# Patient Record
Sex: FEMALE | Race: WHITE | Employment: PART TIME | URBAN - METROPOLITAN AREA
[De-identification: names, ages, dates, MRNs, and addresses within clinical notes are randomized per-mention and may not be internally consistent; named-entity substitution may affect disease eponyms.]

---

## 2019-01-28 ENCOUNTER — TELEPHONE (OUTPATIENT)
Dept: ORTHOPEDICS | Facility: CLINIC | Age: 59
End: 2019-01-28

## 2019-01-28 ENCOUNTER — OFFICE VISIT (OUTPATIENT)
Dept: ORTHOPEDICS | Facility: CLINIC | Age: 59
End: 2019-01-28
Payer: COMMERCIAL

## 2019-01-28 VITALS — DIASTOLIC BLOOD PRESSURE: 80 MMHG | SYSTOLIC BLOOD PRESSURE: 125 MMHG | HEART RATE: 80 BPM | OXYGEN SATURATION: 97 %

## 2019-01-28 DIAGNOSIS — M17.11 PRIMARY OSTEOARTHRITIS OF RIGHT KNEE: Primary | ICD-10-CM

## 2019-01-28 PROCEDURE — 99203 OFFICE O/P NEW LOW 30 MIN: CPT | Performed by: FAMILY MEDICINE

## 2019-01-28 RX ORDER — LIRAGLUTIDE 6 MG/ML
INJECTION SUBCUTANEOUS
Refills: 0 | COMMUNITY
Start: 2019-01-15

## 2019-01-28 ASSESSMENT — PAIN SCALES - GENERAL: PAINLEVEL: MILD PAIN (3)

## 2019-01-28 NOTE — LETTER
1/28/2019         RE: Marisel Jordan  [address] [facility] [address] [id number]        Dear Colleague,    Thank you for referring your patient, Mraisel Jordan, to the [facility]. Please see a copy of my visit note below.    CHIEF COMPLAINT:  Knee right (No known injury. Pain x 1 year)       HISTORY OF PRESENT ILLNESS  Ms. Jordan is a pleasant 58 year old year old female who presents to clinic today with right knee pain.  Marisel explains that her knee has been bothering her for the past year or so.  No inciting event that she can recall.  It feels painful deep inside the knee, this is achy and sharp sometimes her knee swells.  She was previously seen at [facility], she had a corticosteroid injection which helped her for a couple of months.  On follow-up she had a discussion centering around surgery.  She is specifically here to discuss nonoperative treatment modalities.      Additional history: as documented    MEDICAL HISTORY  There is no problem list on file for this patient.      Current Outpatient Medications   Medication Sig Dispense Refill     insulin pen needle (FIFTY50 PEN NEEDLES) 31G X 8 MM miscellaneous For administering victoza at home.       ONETOUCH ULTRA test strip TEST TID AS DIRECTED  3     VICTOZA PEN 18 MG/3ML soln ADMINISTER 1.8 MG UNDER THE SKIN EVERY DAY  0       No Known Allergies    No family history on file.    Additional medical/Social/Surgical histories reviewed in [facility] and updated as appropriate.     REVIEW OF SYSTEMS (1/28/2019)  CONSTITUTIONAL: Denies fever and weight loss  EYES: Denies acute vision changes  ENT: Denies hearing changes or difficulty swallowing  CARDIAC: Denies chest pain or edema  RESPIRATORY: Denies dyspnea, cough or wheeze  GASTROINTESTINAL: Denies abdominal pain, nausea, vomiting  MUSCULOSKELETAL: See HPI  SKIN: Denies any recent rash or lesion  NEUROLOGICAL: Denies numbness or focal weakness  PSYCHIATRIC: No history of psychiatric symptoms or  problems  ENDOCRINE: Denies current diagnosis of diabetes  HEMATOLOGY: Denies episodes of easy bleeding      PHYSICAL EXAM  Vitals:    01/28/19 1302   BP: 125/80   BP Location: Right arm   Patient Position: Sitting   Cuff Size: Adult Large   Pulse: 80   SpO2: 97%     General  - normal appearance, in no obvious distress  CV  - normal popliteal pulse  Pulm  - normal respiratory pattern, non-labored  Musculoskeletal - right knee  - stance: mildly antalgic gait  - inspection: generalized swelling  - palpation: trace medial joint line tenderness, patella and patellar tendon non-tender, normal popliteal pulse  - ROM: 100 degrees flexion, 0 degrees extension  - strength: 5/5 in flexion, 5/5 in extension    Neuro  - no sensory or motor deficit, grossly normal coordination, normal muscle tone  Skin  - no ecchymosis, erythema, warmth, or induration, no obvious rash  Psych  - interactive, appropriate, normal mood and affect           ASSESSMENT & PLAN  Ms. Jordan is a 58 year old year old female who presents to clinic today with right knee osteoarthritis.    Marisel and I had a good discussion centering around the spectrum of treatment options for osteoarthritis that preclude surgery.  We discussed nonoperative treatment with pain relievers, icing, low impact exercise, and weight loss.  We also talked about the role of injection therapy with corticosteroids and hyaluronic acid, as well as the potential role of biologics.    I am referring her to physical therapy in the pool.  I also him going to bring her back for a hyaluronic acid injection.  We gave her the number for our financial counselors and a patient handout.    I will see her at her convenience for a hyaluronic acid injection.    It was a pleasure seeing Marisel today.    Lamberto Thurman DO, [facility]  Primary Care Sports Medicine      Again, thank you for allowing me to participate in the care of your patient.        Sincerely,        Lamberto Thurman DO

## 2019-01-28 NOTE — PROGRESS NOTES
CHIEF COMPLAINT:  Knee right (No known injury. Pain x 1 year)       HISTORY OF PRESENT ILLNESS  Ms. Jordan is a pleasant 58 year old year old female who presents to clinic today with right knee pain.  Marisel explains that her knee has been bothering her for the past year or so.  No inciting event that she can recall.  It feels painful deep inside the knee, this is achy and sharp sometimes her knee swells.  She was previously seen at [facility], she had a corticosteroid injection which helped her for a couple of months.  On follow-up she had a discussion centering around surgery.  She is specifically here to discuss nonoperative treatment modalities.      Additional history: as documented    MEDICAL HISTORY  There is no problem list on file for this patient.      Current Outpatient Medications   Medication Sig Dispense Refill     insulin pen needle (FIFTY50 PEN NEEDLES) 31G X 8 MM miscellaneous For administering victoza at home.       ONETOUCH ULTRA test strip TEST TID AS DIRECTED  3     VICTOZA PEN 18 MG/3ML soln ADMINISTER 1.8 MG UNDER THE SKIN EVERY DAY  0       No Known Allergies    No family history on file.    Additional medical/Social/Surgical histories reviewed in [facility] and updated as appropriate.     REVIEW OF SYSTEMS (1/28/2019)  CONSTITUTIONAL: Denies fever and weight loss  EYES: Denies acute vision changes  ENT: Denies hearing changes or difficulty swallowing  CARDIAC: Denies chest pain or edema  RESPIRATORY: Denies dyspnea, cough or wheeze  GASTROINTESTINAL: Denies abdominal pain, nausea, vomiting  MUSCULOSKELETAL: See HPI  SKIN: Denies any recent rash or lesion  NEUROLOGICAL: Denies numbness or focal weakness  PSYCHIATRIC: No history of psychiatric symptoms or problems  ENDOCRINE: Denies current diagnosis of diabetes  HEMATOLOGY: Denies episodes of easy bleeding      PHYSICAL EXAM  Vitals:    01/28/19 1302   BP: 125/80   BP Location: Right arm   Patient Position: Sitting   Cuff Size: Adult Large   Pulse: 80    SpO2: 97%     General  - normal appearance, in no obvious distress  CV  - normal popliteal pulse  Pulm  - normal respiratory pattern, non-labored  Musculoskeletal - right knee  - stance: mildly antalgic gait  - inspection: generalized swelling  - palpation: trace medial joint line tenderness, patella and patellar tendon non-tender, normal popliteal pulse  - ROM: 100 degrees flexion, 0 degrees extension  - strength: 5/5 in flexion, 5/5 in extension    Neuro  - no sensory or motor deficit, grossly normal coordination, normal muscle tone  Skin  - no ecchymosis, erythema, warmth, or induration, no obvious rash  Psych  - interactive, appropriate, normal mood and affect           ASSESSMENT & PLAN  Ms. Jordan is a 58 year old year old female who presents to clinic today with right knee osteoarthritis.    Marisel and I had a good discussion centering around the spectrum of treatment options for osteoarthritis that preclude surgery.  We discussed nonoperative treatment with pain relievers, icing, low impact exercise, and weight loss.  We also talked about the role of injection therapy with corticosteroids and hyaluronic acid, as well as the potential role of biologics.    I am referring her to physical therapy in the pool.  I also him going to bring her back for a hyaluronic acid injection.  We gave her the number for our financial counselors and a patient handout.    I will see her at her convenience for a hyaluronic acid injection.    It was a pleasure seeing Marisel today.    Lamberto Thurman DO, [facility]  Primary Care Sports Medicine

## 2019-01-28 NOTE — PATIENT INSTRUCTIONS
Order put in for pool therapy  We will see if hyaluronic acid injection is covered.  We will call you when we hear back from your insurance.

## 2019-01-28 NOTE — TELEPHONE ENCOUNTER
Financial Counselor Review for Hylan (gel) injection:    Procedure to be performed (include CPT code):Yes DRAIN/INJECT LARGE JOINT/BURSA - CPT: 23480    Diagnosis code (include ICD-10 code): Right knee degenerative osteoarthritis     Medication order (include J code):Yes     Synvisc One (Hylan G-F 20) - .014  Euflexxa -   Supartz -   Gel One -     Coverage and patient financial responsibility information:YES    Does patient need to be contacted by Financial Counselor:YES, only if Pt is required to pay anything    Note: Do not use abbreviations and route encounter to [facility] SP[facility]TS [facility]IC[facility] [place] [[id number]]

## 2019-01-31 ENCOUNTER — TELEPHONE (OUTPATIENT)
Dept: ORTHOPEDICS | Facility: CLINIC | Age: 59
End: 2019-01-31

## 2019-01-31 NOTE — TELEPHONE ENCOUNTER
Health Call Center    Phone Message    May a detailed message be left on voicemail: yes    Reason for Call: Patient requesting an order for pool therapy be faxed to the [facility] in [place].  Please advise.  Thank you.     Action Taken: Message routed to:  Adult Clinics: Sports Medicine p [id number]

## 2019-02-19 NOTE — TELEPHONE ENCOUNTER
2/19 called but was unable to reach patient. The phone number on file is currently not working.     Nadiya Resendez   Procedure    Ortho/Sports Med/Ent/Eye   [facility]   [phone]

## 2019-02-19 NOTE — TELEPHONE ENCOUNTER
Patient has [employer] - She can have Gel One or Synvisc one every 6 months but only approved for the knee

## 2019-05-29 ENCOUNTER — TELEPHONE (OUTPATIENT)
Dept: ORTHOPEDICS | Facility: CLINIC | Age: 59
End: 2019-05-29

## 2019-05-29 NOTE — TELEPHONE ENCOUNTER
The patient was contacted today and a voicemail was left.     The patient was contacted today to discuss clarification of her appointment with Dr. Thurman tomorrow. Dr. Thurman does not does Stem Cell injection, if the patient is wanted PRP (out of pocket) or gel injection we would be happy to see her tomorrow.

## 2019-05-30 ENCOUNTER — OFFICE VISIT (OUTPATIENT)
Dept: ORTHOPEDICS | Facility: CLINIC | Age: 59
End: 2019-05-30
Payer: COMMERCIAL

## 2019-05-30 VITALS — HEIGHT: 67 IN | BODY MASS INDEX: 32.96 KG/M2 | WEIGHT: 210 LBS

## 2019-05-30 DIAGNOSIS — M17.11 PRIMARY OSTEOARTHRITIS OF RIGHT KNEE: Primary | ICD-10-CM

## 2019-05-30 PROCEDURE — 20610 DRAIN/INJ JOINT/BURSA W/O US: CPT | Mod: RT | Performed by: FAMILY MEDICINE

## 2019-05-30 PROCEDURE — 99207 ZZC DROP WITH A PROCEDURE: CPT | Performed by: FAMILY MEDICINE

## 2019-05-30 ASSESSMENT — MIFFLIN-ST. JEOR: SCORE: 1570.18

## 2019-05-30 ASSESSMENT — PAIN SCALES - GENERAL: PAINLEVEL: MILD PAIN (3)

## 2019-05-30 NOTE — PROGRESS NOTES
"Large Joint Injection/Arthocentesis: R knee joint  Date/Time: 5/30/2019 9:29 AM  Performed by: Lamberto Thurman DO  Authorized by: Lamberto Thurman DO     Indications:  Pain  Needle Size:  25 G  Location:  Knee      Medications:  30 mg cross-Linked Hyaluronate 30 MG/3ML  Outcome:  Tolerated well, no immediate complications  Procedure discussed: discussed risks, benefits, and alternatives    Consent Given by:  Patient  Timeout: timeout called immediately prior to procedure    Prep: patient was prepped and draped in usual sterile fashion          Knee Injection with Hyaluronic Acid      The patient was informed of the risks and the benefits of the procedure and a written consent was signed.  The patient s right knee was prepped with chlorhexidine in sterile fashion.     GelOne syringe and medication removed from packaging and examined for package consistency.    Injection was performed using sterile technique.  A 1.5-inch 25-gauge needle was used to enter the lateral aspect of the right knee.  Injection performed without difficulty. There were no complications. The patient tolerated the procedure well. There was negligible bleeding.     The patient was instructed to ice the knee upon leaving clinic and refrain from overuse over the next 3 days.     The patient was instructed to call or go to the emergency room with any unusual pain, swelling, redness, or if otherwise concerned.      Jose Thurman DO CAQSM          [facility] FOLLOW-UP VISIT 5/30/2019    Marisel Jordan's chief complaint for this visit includes:  Chief Complaint   Patient presents with     RECHECK     The patient is here today to discuss right knee injection options      PCP: No Ref-Primary, Physician    Referring Provider:  Referred Self, MD  No address on file    Ht 1.702 m (5' 7\")   Wt 95.3 kg (210 lb)   BMI 32.89 kg/m    Mild Pain (3)       Interval History:     Follow up reason: right knee pain    Following Therapeutic Plan: Yes "     Pain: Worsening    Function: Worsening   Medical History:    Any recent changes to your medical history? No    Any new medication prescribed since last visit? No    Review of Systems:    Do you have fever, chills, weight loss? No    Do you have any vision problems? No    Do you have any chest pain or edema? No    Do you have any shortness of breath or wheezing?  No    Do you have stomach problems? No    Do you have any numbness or focal weakness? No    Do you have diabetes? Yes,     Do you have problems with bleeding or clotting? No    Do you have an rashes or other skin lesions? No

## 2019-05-30 NOTE — LETTER
5/30/2019         RE: Marisel Jordan  [address] [id number]        Dear Colleague,    Thank you for referring your patient, Marisel Jordan, to the [facility]. Please see a copy of my visit note below.    Large Joint Injection/Arthocentesis: R knee joint  Date/Time: 5/30/2019 9:29 AM  Performed by: Lamberto Thurman DO  Authorized by: Lamberto Thurman DO     Indications:  Pain  Needle Size:  25 G  Location:  Knee      Medications:  30 mg cross-Linked Hyaluronate 30 MG/3ML  Outcome:  Tolerated well, no immediate complications  Procedure discussed: discussed risks, benefits, and alternatives    Consent Given by:  Patient  Timeout: timeout called immediately prior to procedure    Prep: patient was prepped and draped in usual sterile fashion          Knee Injection with Hyaluronic Acid      The patient was informed of the risks and the benefits of the procedure and a written consent was signed.  The patient s right knee was prepped with chlorhexidine in sterile fashion.     GelOne syringe and medication removed from packaging and examined for package consistency.    Injection was performed using sterile technique.  A 1.5-inch 25-gauge needle was used to enter the lateral aspect of the right knee.  Injection performed without difficulty. There were no complications. The patient tolerated the procedure well. There was negligible bleeding.     The patient was instructed to ice the knee upon leaving clinic and refrain from overuse over the next 3 days.     The patient was instructed to call or go to the emergency room with any unusual pain, swelling, redness, or if otherwise concerned.      Jose Thurman DO [facility] Medicine FOLLOW-UP VISIT 5/30/2019    Marisel Jordan's chief complaint for this visit includes:  Chief Complaint   Patient presents with     RECHECK     The patient is here today to discuss right knee injection options      PCP: No Ref-Primary,  "Physician    Referring Provider:  Referred Self, MD  No address on file    Ht 1.702 m (5' 7\")   Wt 95.3 kg (210 lb)   BMI 32.89 kg/m     Mild Pain (3)       Interval History:     Follow up reason: right knee pain    Following Therapeutic Plan: Yes     Pain: Worsening    Function: Worsening   Medical History:    Any recent changes to your medical history? No    Any new medication prescribed since last visit? No    Review of Systems:    Do you have fever, chills, weight loss? No    Do you have any vision problems? No    Do you have any chest pain or edema? No    Do you have any shortness of breath or wheezing?  No    Do you have stomach problems? No    Do you have any numbness or focal weakness? No    Do you have diabetes? Yes,     Do you have problems with bleeding or clotting? No    Do you have an rashes or other skin lesions? No      Again, thank you for allowing me to participate in the care of your patient.        Sincerely,        Lamberto Thurman, DO    "

## 2019-09-16 ENCOUNTER — TELEPHONE (OUTPATIENT)
Dept: ORTHOPEDICS | Facility: CLINIC | Age: 59
End: 2019-09-16

## 2019-09-16 NOTE — TELEPHONE ENCOUNTER
[facility] Call Center    Phone Message    May a detailed message be left on voicemail: yes    Reason for Call: Symptoms or Concerns     Questioning where she can have stem cell injections done. Please call to advise. `        Action Taken: Message routed to:  Adult Clinics: Sports Medicine p [id number]

## 2019-09-17 NOTE — TELEPHONE ENCOUNTER
Dr. Thurman was consulted.     The patient was contacted and a voicemail was left. It was explained to the patient that Dr. Thurman does not recommend stem cell procedure, as it is unproven to be helpful. If Marisel is truly interested she can contact [facility] - Dr. Niko Juan.

## 2019-10-02 ENCOUNTER — TELEPHONE (OUTPATIENT)
Dept: ORTHOPEDICS | Facility: CLINIC | Age: 59
End: 2019-10-02

## 2019-10-02 NOTE — TELEPHONE ENCOUNTER
[facility] Call Center    Phone Message    May a detailed message be left on voicemail: yes    Reason for Call: Other: thinks knee popped out of place - wants to be seen today if possible. has appt tomorrow. needs to know if she can be worked in today. please call to advise.      Action Taken: Message routed to:  Adult Clinics: Sports Medicine p [id number]

## 2019-10-02 NOTE — TELEPHONE ENCOUNTER
The patient was contacted today and a voicemail was left. It was explained to the patient that Dr. Thurman does not have any openings today, but she can be seen at the [facility] in [place], they are open until 7pm. The patient was given the address.  The patient was given our call back number if she has any additional questions or concerns.,

## 2019-10-03 ENCOUNTER — OFFICE VISIT (OUTPATIENT)
Dept: ORTHOPEDICS | Facility: CLINIC | Age: 59
End: 2019-10-03
Payer: COMMERCIAL

## 2019-10-03 VITALS — HEIGHT: 67 IN | WEIGHT: 210 LBS | BODY MASS INDEX: 32.96 KG/M2

## 2019-10-03 DIAGNOSIS — M25.561 ACUTE PAIN OF RIGHT KNEE: Primary | ICD-10-CM

## 2019-10-03 PROCEDURE — 99213 OFFICE O/P EST LOW 20 MIN: CPT | Performed by: FAMILY MEDICINE

## 2019-10-03 ASSESSMENT — PAIN SCALES - GENERAL: PAINLEVEL: MILD PAIN (2)

## 2019-10-03 ASSESSMENT — MIFFLIN-ST. JEOR: SCORE: 1570.18

## 2019-10-03 NOTE — LETTER
"    10/3/2019         RE: Marisel Jordan  [address] [facility] [address]        Dear Colleague,    Thank you for referring your patient, Marisel Jordan, to the [facility]. Please see a copy of my visit note below.    HISTORY OF PRESENT ILLNESS  Ms. Jordan is a pleasant 59 year old year old female following up with right knee pain.  Marisel last saw me for her knee in May, at that visit I performed a hyaluronic acid injection.  She had done well for a couple of months, unfortunately she experienced another injury to her knee.  A couple of weeks ago she was walking when her knee turned, she felt a pop in her knee, almost bring her to the ground.  This was accompanied by the delayed onset of swelling.  Her knee has continued to feel unstable since.  She has great pain when extending her knee.     PHYSICAL EXAM  Vitals:    10/03/19 1605   Weight: 95.3 kg (210 lb)   Height: 1.702 m (5' 7\")     General  - normal appearance, in no obvious distress  CV  - normal popliteal pulse  Pulm  - normal respiratory pattern, non-labored  Musculoskeletal - right knee  - stance: Antalgic gait favoring right leg  - inspection: 1+ effusion  - palpation: Tender general medial knee, patellar tendon  - ROM: 120 degrees flexion, lacks 10 degrees extension, painful  - strength: 5/5 in flexion, 5/5 in extension  - neuro: no sensory or motor deficit  - special tests:  unalbe to perform  Neuro  - no sensory or motor deficit, grossly normal coordination, normal muscle tone  Skin  - no ecchymosis, erythema, warmth, or induration, no obvious rash  Psych  - interactive, appropriate, normal mood and affect        ASSESSMENT & PLAN  Ms. Jordan is a 59 year old year old female following up with right knee pain that is now acute on chronic.    Given her effusion and instability I am ordering an MRI.  I am specifically looking for a fracture line or potential partial patellar tendon tear.    We also offered her a brace, she " "should wear this for stability.    I will get in touch with her with the results.    It was a pleasure seeing Jo Ann        Lamberto Thurman DO, CAQSM      This note was constructed using Dragon dictation software, please excuse any minor errors in spelling, grammar, or syntax.        [facility] FOLLOW-UP VISIT 10/3/2019    Marisel Jordan's chief complaint for this visit includes:  Chief Complaint   Patient presents with     RECHECK     right knee pain, felt a pop yesterday, unable to fully extend knee      PCP: No Ref-Primary, Physician    Referring Provider:  Referred Self, MD  No address on file    Ht 1.702 m (5' 7\")   Wt 95.3 kg (210 lb)   BMI 32.89 kg/m     Mild Pain (2)       Interval History:     Follow up reason: right knee pain       Medical History:    Any recent changes to your medical history? No    Any new medication prescribed since last visit? No    Review of Systems:    Do you have fever, chills, weight loss? No    Do you have any vision problems? No    Do you have any chest pain or edema? No    Do you have any shortness of breath or wheezing?  No    Do you have stomach problems? No    Do you have any numbness or focal weakness? No    Do you have diabetes? Yes,     Do you have problems with bleeding or clotting? No    Do you have an rashes or other skin lesions? No      Again, thank you for allowing me to participate in the care of your patient.        Sincerely,        Lamberto Thurman DO    "

## 2019-10-03 NOTE — PROGRESS NOTES
"HISTORY OF PRESENT ILLNESS  Ms. Jordan is a pleasant 59 year old year old female following up with right knee pain.  Marisel last saw me for her knee in May, at that visit I performed a hyaluronic acid injection.  She had done well for a couple of months, unfortunately she experienced another injury to her knee.  A couple of weeks ago she was walking when her knee turned, she felt a pop in her knee, almost bring her to the ground.  This was accompanied by the delayed onset of swelling.  Her knee has continued to feel unstable since.  She has great pain when extending her knee.     PHYSICAL EXAM  Vitals:    10/03/19 1605   Weight: 95.3 kg (210 lb)   Height: 1.702 m (5' 7\")     General  - normal appearance, in no obvious distress  CV  - normal popliteal pulse  Pulm  - normal respiratory pattern, non-labored  Musculoskeletal - right knee  - stance: Antalgic gait favoring right leg  - inspection: 1+ effusion  - palpation: Tender general medial knee, patellar tendon  - ROM: 120 degrees flexion, lacks 10 degrees extension, painful  - strength: 5/5 in flexion, 5/5 in extension  - neuro: no sensory or motor deficit  - special tests:  unalbe to perform  Neuro  - no sensory or motor deficit, grossly normal coordination, normal muscle tone  Skin  - no ecchymosis, erythema, warmth, or induration, no obvious rash  Psych  - interactive, appropriate, normal mood and affect        ASSESSMENT & PLAN  Ms. Jordan is a 59 year old year old female following up with right knee pain that is now acute on chronic.    Given her effusion and instability I am ordering an MRI.  I am specifically looking for a fracture line or potential partial patellar tendon tear.    We also offered her a brace, she should wear this for stability.    I will get in touch with her with the results.    It was a pleasure seeing Marisel.        Lamberto Thurman, DO, CAQSM      This note was constructed using Dragon dictation software, please excuse any minor errors in " "spelling, grammar, or syntax.        [facility] FOLLOW-UP VISIT 10/3/2019    Marisel Jordan's chief complaint for this visit includes:  Chief Complaint   Patient presents with     RECHECK     right knee pain, felt a pop yesterday, unable to fully extend knee      PCP: No Ref-Primary, Physician    Referring Provider:  Referred Self, MD  No address on file    Ht 1.702 m (5' 7\")   Wt 95.3 kg (210 lb)   BMI 32.89 kg/m    Mild Pain (2)       Interval History:     Follow up reason: right knee pain       Medical History:    Any recent changes to your medical history? No    Any new medication prescribed since last visit? No    Review of Systems:    Do you have fever, chills, weight loss? No    Do you have any vision problems? No    Do you have any chest pain or edema? No    Do you have any shortness of breath or wheezing?  No    Do you have stomach problems? No    Do you have any numbness or focal weakness? No    Do you have diabetes? Yes,     Do you have problems with bleeding or clotting? No    Do you have an rashes or other skin lesions? No    "

## 2019-10-07 ENCOUNTER — ANCILLARY PROCEDURE (OUTPATIENT)
Dept: MRI IMAGING | Facility: CLINIC | Age: 59
End: 2019-10-07
Attending: FAMILY MEDICINE
Payer: COMMERCIAL

## 2019-10-07 DIAGNOSIS — M25.561 ACUTE PAIN OF RIGHT KNEE: ICD-10-CM

## 2019-10-07 PROCEDURE — 73721 MRI JNT OF LWR EXTRE W/O DYE: CPT | Mod: 52 | Performed by: RADIOLOGY

## 2019-10-08 ENCOUNTER — TELEPHONE (OUTPATIENT)
Dept: ORTHOPEDICS | Facility: CLINIC | Age: 59
End: 2019-10-08

## 2019-10-08 NOTE — TELEPHONE ENCOUNTER
The patient was contacted to discuss her concerns after not being able to complete the MRI.     The radiologist was able to read some of the completed MRI. Dr. Thurman recommends the patient to follow up and to discuss what was read. We are unsure if another MRI at this time would be covered by insurance.     The patient is agreeable and will follow up on Thursday.

## 2019-10-10 ENCOUNTER — OFFICE VISIT (OUTPATIENT)
Dept: ORTHOPEDICS | Facility: CLINIC | Age: 59
End: 2019-10-10
Payer: COMMERCIAL

## 2019-10-10 VITALS — WEIGHT: 210 LBS | BODY MASS INDEX: 32.96 KG/M2 | HEIGHT: 67 IN

## 2019-10-10 DIAGNOSIS — M17.11 PRIMARY OSTEOARTHRITIS OF RIGHT KNEE: Primary | ICD-10-CM

## 2019-10-10 PROCEDURE — 99213 OFFICE O/P EST LOW 20 MIN: CPT | Performed by: FAMILY MEDICINE

## 2019-10-10 ASSESSMENT — MIFFLIN-ST. JEOR: SCORE: 1570.18

## 2019-10-10 NOTE — PROGRESS NOTES
"HISTORY OF PRESENT ILLNESS  Ms. Jordan is a pleasant 59 year old year old female following up with right knee osteoarthritis.  Marisel is here to review her MRI.     PHYSICAL EXAM  Vitals:    10/10/19 1641   Weight: 95.3 kg (210 lb)   Height: 1.702 m (5' 7\")       ASSESSMENT & PLAN  Ms. Jordan is a 59 year old year old female following up with right knee osteoarthritis.    I reviewed her MR in the room with her, unfortunately she was unable to tolerate the MRI and limited views were obtained.  These limited views do show marked osteoarthritis in addition to intra-articular bodies in the patellofemoral and medial femoral-tibial compartments.    I had a very long discussion with Marisel centering around her options.  She has failed corticosteroid injections, hyaluronic acid, bracing, as well as physical therapy.  She may be a good surgical candidate, specifically for a total knee arthroplasty.  She is going to contemplate this, although she is very reticent to entertain this, as she has mentioned to me in the past.    Marisel reiterated that she is in interested in stem cell treatment.  We discussed the lack of known long-term efficacy, we also discussed that this is not a procedure that the [facility] offers.  I do have colleagues across the city who perform this, I am placing a referral for her to discuss this with an outside institution.    It was a pleasure seeing Marisel.    20 minutes was spent in the room, 15 of which was spent on counseling and coordination of care.        Lamberto Thurman DO, CAQSM      This note was constructed using Dragon dictation software, please excuse any minor errors in spelling, grammar, or syntax.    "

## 2019-10-10 NOTE — LETTER
"    10/10/2019         RE: Marisel Jordan  [address] [facility] [address] [address]        Dear Colleague,    Thank you for referring your patient, Marisel Jordan, to the [facility]. Please see a copy of my visit note below.    HISTORY OF PRESENT ILLNESS  Ms. Jordan is a pleasant 59 year old year old female following up with right knee osteoarthritis.  Marisel is here to review her MRI.     PHYSICAL EXAM  Vitals:    10/10/19 1641   Weight: 95.3 kg (210 lb)   Height: 1.702 m (5' 7\")       ASSESSMENT & PLAN  Ms. Jordan is a 59 year old year old female following up with right knee osteoarthritis.    I reviewed her MR in the room with her, unfortunately she was unable to tolerate the MRI and limited views were obtained.  These limited views do show marked osteoarthritis in addition to intra-articular bodies in the patellofemoral and medial femoral-tibial compartments.    I had a very long discussion with Marisel centering around her options.  She has failed corticosteroid injections, hyaluronic acid, bracing, as well as physical therapy.  She may be a good surgical candidate, specifically for a total knee arthroplasty.  She is going to contemplate this, although she is very reticent to entertain this, as she has mentioned to me in the past.    Marisel reiterated that she is in interested in stem cell treatment.  We discussed the lack of known long-term efficacy, we also discussed that this is not a procedure that the [facility] offers.  I do have colleagues across the city who perform this, I am placing a referral for her to discuss this with an outside institution.    It was a pleasure seeing Marisel.    20 minutes was spent in the room, 15 of which was spent on counseling and coordination of care.        Lamberto Thurman DO, CAJUANAM      This note was constructed using Dragon dictation software, please excuse any minor errors in spelling, grammar, or syntax.      Again, thank you for allowing me to " participate in the care of your patient.        Sincerely,        Lamberto Thurman, DO